# Patient Record
Sex: MALE | Race: ASIAN | NOT HISPANIC OR LATINO | Employment: UNEMPLOYED | URBAN - METROPOLITAN AREA
[De-identification: names, ages, dates, MRNs, and addresses within clinical notes are randomized per-mention and may not be internally consistent; named-entity substitution may affect disease eponyms.]

---

## 2020-10-15 ENCOUNTER — TELEPHONE (OUTPATIENT)
Dept: TRANSPLANT | Facility: CLINIC | Age: 35
End: 2020-10-15

## 2020-10-15 NOTE — TELEPHONE ENCOUNTER
Multiple calls to patient to schedule appointment ([phone]). No answer. Recording says call can not be completed at this time and try your call again later  Also called [phone]. Still unable to reach patient.    Records from referring scanned in [facility] and sent to be scanned into Epic.

## 2020-10-21 ENCOUNTER — TELEPHONE (OUTPATIENT)
Dept: TRANSPLANT | Facility: CLINIC | Age: 35
End: 2020-10-21

## 2020-10-21 DIAGNOSIS — I50.9 CONGESTIVE HEART FAILURE, UNSPECIFIED HF CHRONICITY, UNSPECIFIED HEART FAILURE TYPE: Primary | ICD-10-CM

## 2020-11-02 ENCOUNTER — HOSPITAL ENCOUNTER (OUTPATIENT)
Dept: PULMONOLOGY | Facility: CLINIC | Age: 35
Discharge: HOME | End: 2020-11-02
Payer: MEDICAID

## 2020-11-02 ENCOUNTER — CLINICAL SUPPORT (OUTPATIENT)
Dept: TRANSPLANT | Facility: CLINIC | Age: 35
End: 2020-11-02
Payer: MEDICAID

## 2020-11-02 ENCOUNTER — OFFICE VISIT (OUTPATIENT)
Dept: TRANSPLANT | Facility: CLINIC | Age: 35
End: 2020-11-02
Payer: MEDICAID

## 2020-11-02 ENCOUNTER — HOSPITAL ENCOUNTER (OUTPATIENT)
Dept: CARDIOLOGY | Facility: HOSPITAL | Age: 35
Discharge: HOME | End: 2020-11-02
Attending: INTERNAL MEDICINE
Payer: MEDICAID

## 2020-11-02 VITALS
DIASTOLIC BLOOD PRESSURE: 77 MMHG | WEIGHT: 200 LBS | HEART RATE: 77 BPM | HEIGHT: 63 IN | BODY MASS INDEX: 35.44 KG/M2 | SYSTOLIC BLOOD PRESSURE: 110 MMHG

## 2020-11-02 VITALS
SYSTOLIC BLOOD PRESSURE: 110 MMHG | BODY MASS INDEX: 37.34 KG/M2 | DIASTOLIC BLOOD PRESSURE: 77 MMHG | WEIGHT: 210.75 LBS | HEART RATE: 77 BPM | HEIGHT: 63 IN

## 2020-11-02 VITALS — BODY MASS INDEX: 36.68 KG/M2 | HEIGHT: 63 IN | WEIGHT: 207 LBS

## 2020-11-02 DIAGNOSIS — I50.9 CONGESTIVE HEART FAILURE, UNSPECIFIED HF CHRONICITY, UNSPECIFIED HEART FAILURE TYPE: Primary | ICD-10-CM

## 2020-11-02 DIAGNOSIS — I25.10 CORONARY ARTERY DISEASE INVOLVING NATIVE CORONARY ARTERY OF NATIVE HEART WITHOUT ANGINA PECTORIS: ICD-10-CM

## 2020-11-02 DIAGNOSIS — E11.9 TYPE 2 DIABETES MELLITUS WITHOUT COMPLICATION, WITH LONG-TERM CURRENT USE OF INSULIN: ICD-10-CM

## 2020-11-02 DIAGNOSIS — E78.5 DYSLIPIDEMIA: ICD-10-CM

## 2020-11-02 DIAGNOSIS — Z79.4 TYPE 2 DIABETES MELLITUS WITHOUT COMPLICATION, WITH LONG-TERM CURRENT USE OF INSULIN: ICD-10-CM

## 2020-11-02 DIAGNOSIS — I50.9 CONGESTIVE HEART FAILURE, UNSPECIFIED HF CHRONICITY, UNSPECIFIED HEART FAILURE TYPE: ICD-10-CM

## 2020-11-02 LAB
ASCENDING AORTA: 2.51 CM
AV INDEX (PROSTH): 0.48
AV MEAN GRADIENT: 1 MMHG
AV PEAK GRADIENT: 2 MMHG
AV VALVE AREA: 2.04 CM2
AV VELOCITY RATIO: 0.44
BSA FOR ECHO PROCEDURE: 2.01 M2
CV ECHO LV RWT: 0.25 CM
DOP CALC AO PEAK VEL: 0.68 M/S
DOP CALC AO VTI: 9.63 CM
DOP CALC LVOT AREA: 4.2 CM2
DOP CALC LVOT DIAMETER: 2.32 CM
DOP CALC LVOT PEAK VEL: 0.3 M/S
DOP CALC LVOT STROKE VOLUME: 19.65 CM3
DOP CALCLVOT PEAK VEL VTI: 4.65 CM
ECHO LV POSTERIOR WALL: 0.68 CM (ref 0.6–1.1)
FRACTIONAL SHORTENING: 17 % (ref 28–44)
INTERVENTRICULAR SEPTUM: 0.57 CM (ref 0.6–1.1)
LA MAJOR: 4.4 CM
LA MINOR: 4.96 CM
LA WIDTH: 4.16 CM
LEFT ATRIUM SIZE: 4.75 CM
LEFT ATRIUM VOLUME INDEX MOD: 39.3 ML/M2
LEFT ATRIUM VOLUME INDEX: 40.5 ML/M2
LEFT ATRIUM VOLUME MOD: 76 CM3
LEFT ATRIUM VOLUME: 78.32 CM3
LEFT INTERNAL DIMENSION IN SYSTOLE: 4.59 CM (ref 2.1–4)
LEFT VENTRICLE DIASTOLIC VOLUME INDEX: 76.4 ML/M2
LEFT VENTRICLE DIASTOLIC VOLUME: 147.74 ML
LEFT VENTRICLE MASS INDEX: 61 G/M2
LEFT VENTRICLE SYSTOLIC VOLUME INDEX: 50 ML/M2
LEFT VENTRICLE SYSTOLIC VOLUME: 96.6 ML
LEFT VENTRICULAR INTERNAL DIMENSION IN DIASTOLE: 5.51 CM (ref 3.5–6)
LEFT VENTRICULAR MASS: 118.44 G
PISA TR MAX VEL: 3.07 M/S
PULM VEIN S/D RATIO: 0.4
PV PEAK D VEL: 0.3 M/S
PV PEAK S VEL: 0.12 M/S
RA MAJOR: 5.5 CM
RA PRESSURE: 8 MMHG
RA WIDTH: 4.9 CM
RIGHT VENTRICULAR END-DIASTOLIC DIMENSION: 4.3 CM
RV TISSUE DOPPLER FREE WALL SYSTOLIC VELOCITY 1 (APICAL 4 CHAMBER VIEW): 4.58 CM/S
SINUS: 3.09 CM
STJ: 3 CM
TDI LATERAL: 0.11 M/S
TDI SEPTAL: 0.05 M/S
TDI: 0.08 M/S
TR MAX PG: 38 MMHG
TRICUSPID ANNULAR PLANE SYSTOLIC EXCURSION: 1.15 CM
TV REST PULMONARY ARTERY PRESSURE: 46 MMHG

## 2020-11-02 PROCEDURE — 94618 PULMONARY STRESS TESTING: ICD-10-PCS | Mod: 26,S$PBB,NTX, | Performed by: INTERNAL MEDICINE

## 2020-11-02 PROCEDURE — 99214 OFFICE O/P EST MOD 30 MIN: CPT | Mod: PBBFAC,25,TXP | Performed by: INTERNAL MEDICINE

## 2020-11-02 PROCEDURE — 93306 TTE W/DOPPLER COMPLETE: CPT | Mod: 26,NTX,, | Performed by: INTERNAL MEDICINE

## 2020-11-02 PROCEDURE — 99999 PR PBB SHADOW E&M-EST. PATIENT-LVL IV: CPT | Mod: PBBFAC,TXP,, | Performed by: INTERNAL MEDICINE

## 2020-11-02 PROCEDURE — 93306 ECHO (CUPID ONLY): ICD-10-PCS | Mod: 26,NTX,, | Performed by: INTERNAL MEDICINE

## 2020-11-02 PROCEDURE — 93306 TTE W/DOPPLER COMPLETE: CPT | Mod: TXP

## 2020-11-02 PROCEDURE — 94618 PULMONARY STRESS TESTING: CPT | Mod: 26,S$PBB,NTX, | Performed by: INTERNAL MEDICINE

## 2020-11-02 PROCEDURE — 99204 PR OFFICE/OUTPT VISIT, NEW, LEVL IV, 45-59 MIN: ICD-10-PCS | Mod: S$PBB,NTX,, | Performed by: INTERNAL MEDICINE

## 2020-11-02 PROCEDURE — 99999 PR PBB SHADOW E&M-EST. PATIENT-LVL IV: ICD-10-PCS | Mod: PBBFAC,TXP,, | Performed by: INTERNAL MEDICINE

## 2020-11-02 PROCEDURE — 94618 PULMONARY STRESS TESTING: CPT | Mod: PBBFAC,NTX | Performed by: INTERNAL MEDICINE

## 2020-11-02 PROCEDURE — 99204 OFFICE O/P NEW MOD 45 MIN: CPT | Mod: S$PBB,NTX,, | Performed by: INTERNAL MEDICINE

## 2020-11-02 RX ORDER — NAPROXEN SODIUM 220 MG/1
81 TABLET, FILM COATED ORAL DAILY
COMMUNITY
Start: 2019-12-20

## 2020-11-02 RX ORDER — SPIRONOLACTONE 25 MG/1
50 TABLET ORAL DAILY
COMMUNITY
Start: 2020-10-27 | End: 2020-11-17

## 2020-11-02 RX ORDER — CLOPIDOGREL BISULFATE 75 MG/1
75 TABLET ORAL DAILY
COMMUNITY
Start: 2019-12-20

## 2020-11-02 RX ORDER — TORSEMIDE 20 MG/1
40 TABLET ORAL 2 TIMES DAILY
Qty: 120 TABLET | Refills: 11 | Status: SHIPPED | OUTPATIENT
Start: 2020-11-02 | End: 2020-11-17

## 2020-11-02 RX ORDER — DULOXETIN HYDROCHLORIDE 30 MG/1
30 CAPSULE, DELAYED RELEASE ORAL DAILY
COMMUNITY
Start: 2020-08-24

## 2020-11-02 RX ORDER — ATORVASTATIN CALCIUM 40 MG/1
40 TABLET, FILM COATED ORAL NIGHTLY
COMMUNITY
Start: 2019-12-20

## 2020-11-02 RX ORDER — PANTOPRAZOLE SODIUM 40 MG/1
40 TABLET, DELAYED RELEASE ORAL EVERY MORNING
COMMUNITY
Start: 2020-10-26

## 2020-11-02 RX ORDER — LEVOTHYROXINE SODIUM 25 UG/1
25 TABLET ORAL DAILY
COMMUNITY
Start: 2020-10-14

## 2020-11-02 RX ORDER — CARVEDILOL 3.12 MG/1
3.12 TABLET ORAL 2 TIMES DAILY
COMMUNITY
Start: 2019-12-20

## 2020-11-02 RX ORDER — SACUBITRIL AND VALSARTAN 24; 26 MG/1; MG/1
24-26 TABLET, FILM COATED ORAL 2 TIMES DAILY
COMMUNITY
Start: 2020-10-29

## 2020-11-02 RX ORDER — MIRTAZAPINE 15 MG/1
15 TABLET, FILM COATED ORAL NIGHTLY
COMMUNITY
Start: 2020-09-04

## 2020-11-02 RX ORDER — INSULIN GLARGINE 100 [IU]/ML
INJECTION, SOLUTION SUBCUTANEOUS
COMMUNITY
Start: 2020-08-24

## 2020-11-02 RX ORDER — RANOLAZINE 500 MG/1
500 TABLET, EXTENDED RELEASE ORAL 2 TIMES DAILY
COMMUNITY

## 2020-11-02 RX ORDER — FUROSEMIDE 20 MG/1
20 TABLET ORAL 2 TIMES DAILY
COMMUNITY
End: 2020-11-02 | Stop reason: ALTCHOICE

## 2020-11-02 RX ORDER — ACETAMINOPHEN 500 MG
1 TABLET ORAL DAILY
COMMUNITY

## 2020-11-02 RX ORDER — METOCLOPRAMIDE 10 MG/1
10 TABLET ORAL 3 TIMES DAILY
COMMUNITY
Start: 2020-10-19

## 2020-11-02 NOTE — PROCEDURES
Kp Suarez is a 35 y.o.  male patient, who presents for a 6 minute walk test ordered by MEI Brewer.  The diagnosis is Shortness of Breath.  The patient's BMI is 36.7 kg/m2.  Predicted distance (lower limit of normal) is 538.21 meters.      Test Results:    The test was completed without stopping.  The total time walked was 360 seconds.  During walking, the patient reported:  Chest pain, Dyspnea. The patient used no assistive devices  during testing.     11/02/2020---------Distance: 198.12 meters (650 feet)     O2 Sat % Supplemental Oxygen Heart Rate Blood Pressure Humberto Scale   Pre-exercise  (Resting) 96 % Room Air 101 bpm 112/74 mmHg 0.5   During Exercise 96 % Room Air 119 bpm 105/76 mmHg 4   Post-exercise  (Recovery) 97 % Room Air  110 bpm   mmHg       Recovery Time: 79 seconds    Performing nurse/tech: Parrish MUNIZ      PREVIOUS STUDY:   The patient has not had a previous study.      CLINICAL INTERPRETATION:  Six minute walk distance is 198.12 meters (650 feet) with somewhat heavy dyspnea.  During exercise, there was no significant desaturation while breathing room air.  Both blood pressure and heart rate remained stable with walking.  Tachycardia was present prior to exercise.  The patient reported non-pulmonary symptoms during exercise.  Significant exercise impairment is likely due to subjective symptoms.  No previous study performed.  Based upon age and body mass index, exercise capacity is less than predicted.

## 2020-11-02 NOTE — PATIENT INSTRUCTIONS
-stop lasix.  -start taking torsemide 40 mg PO BID  -increase spironolactone to 50 mg daily  -BMP and magnesium every week, starting this Friday.  -echocardiogram today  -f/u in 2 weeks  -monitor your weight daily. Call us if you are not loosing weight.  -Low salt intake.  -Limit fluid intake to NMT 1.5 liters/day.

## 2020-11-02 NOTE — PROGRESS NOTES
Not asked to see patient in clinic at his time nor was pre- transplant coordinators. Patient is to follow up in 2 weeks and if he needs to be seen at this time we will be informed.

## 2020-11-02 NOTE — LETTER
November 4, 2020        Orin Peters  [address]  [address]  [facility] [place] [address]  Phone: [phone]  Fax: [phone]             [id number]  [address]  [place] [address]  Phone: [phone]   Patient: Kp Suarez   MR Number: [id number]   YOB: 1985   Date of Visit: 11/2/2020       Dear Dr. Orin Peters    Thank you for referring Kp Suarez to me for evaluation. Attached you will find relevant portions of my assessment and plan of care.    If you have questions, please do not hesitate to call me. I look forward to following Kp Suarez along with you.    Sincerely,    Vasiliy Avery MD    Enclosure    If you would like to receive this communication electronically, please contact [email or web address] or [phone] to request [employer] Link access.    [employer] Link is a tool which provides read-only access to select patient information with whom you have a relationship. Its easy to use and provides real time access to review your patients record including encounter summaries, notes, results, and demographic information.    If you feel you have received this communication in error or would no longer like to receive these types of communications, please e-mail [email or web address]

## 2020-11-02 NOTE — NURSING NOTE
Pt verified by 2 identifiers, allergies reviewed.  IV access for echo contrast attempted X 1 unsuccessful.  Pt tolerated well w/o C/O.

## 2020-11-02 NOTE — PROGRESS NOTES
Advanced Heart Failure and Transplantation Clinic Follow up.        Attending Physician: Vasiliy Avery MD.  The patient's last visit with me was on Visit date not found.   Reason for the visit: evaluation for advanced therapies.      HPI.  34 yo man from [facility], with a history of CAD (severe 3 vessel disease), ischemic CMP and HFrEF, all diagnosed November 2019. He underwent CABG at that time (12/2019). Since then he has been dealing with congestion, large ascites and pleural effusions. He has been referred from [place] for considerations for advanced therapies.   Around the same time he was found to have diabetes and hypertension.  He has an ICD placed on July 16, 2020.  Since his CABG he said he was admitted 5 times, 4 times for congestion and 1 for COVID back on May.  His brother is with the patient.  Their understanding is that they have been referred for advanced therapies/transplant evaluation. They don't feel that is what he needs. He wants to have the fluid removed.  He only takes lasix 20 mg twice a day.      PMH.  HF (mother, passed away last year from cardiac arrest)  CAD mother  DM  HTN  Liver cancer (father)    Review of Systems   Constitutional: Negative for chills, diaphoresis and fever.   HENT: Negative for nasal congestion, rhinorrhea and sore throat.    Eyes: Negative for visual disturbance.   Respiratory: Positive for shortness of breath.    Cardiovascular: Positive for leg swelling. Negative for chest pain.   Gastrointestinal: Negative for abdominal pain, diarrhea, nausea and vomiting.   Genitourinary: Negative for difficulty urinating, dysuria and hematuria.   Integumentary:  Negative for rash.   Neurological: Negative for seizures, syncope and light-headedness.   Psychiatric/Behavioral: Negative for agitation and hallucinations.        Past Medical History:   Diagnosis Date    Cardiomyopathy     CHF  "(congestive heart failure)     Coronary artery disease     Diabetes mellitus, type 2     Hyperlipidemia     Hypertension     Obesity         Past Surgical History:   Procedure Laterality Date    CORONARY ARTERY BYPASS GRAFT              Review of patient's allergies indicates:   Allergen Reactions    Levofloxacin         Current Outpatient Medications   Medication Instructions    aspirin 81 mg, Oral, Daily    atorvastatin (LIPITOR) 40 mg, Oral, Nightly    carvediloL (COREG) 3.125 mg, Oral, 2 times daily    cholecalciferol, vitamin D3, (VITAMIN D3) 50 mcg (2,000 unit) Cap 1 capsule, Oral, Daily    clopidogreL (PLAVIX) 75 mg, Oral, Daily    DULoxetine (CYMBALTA) 30 mg, Oral, Daily    ENTRESTO 24-26 mg per tablet 24-26 mg, Oral, 2 times daily    furosemide (LASIX) 20 mg, Oral, 2 times daily    LANTUS SOLOSTAR U-100 INSULIN glargine 100 units/mL (3mL) SubQ pen ADM 25 UNI SC QD HS    levothyroxine (SYNTHROID) 25 mcg, Oral, Daily    metoclopramide HCl (REGLAN) 10 mg, Oral, 3 times daily    mirtazapine (REMERON) 15 mg, Oral, Nightly    pantoprazole (PROTONIX) 40 mg, Oral, Every morning    ranolazine (RANEXA) 500 mg, Oral, 2 times daily    spironolactone (ALDACTONE) 25 mg, Oral, Daily        Vitals:    11/02/20 1008   BP: 110/77   Pulse: 77        Wt Readings from Last 3 Encounters:   11/02/20 95.6 kg (210 lb 12.2 oz)   11/02/20 93.9 kg (207 lb)     Temp Readings from Last 3 Encounters:   No data found for Temp     BP Readings from Last 3 Encounters:   11/02/20 110/77     Pulse Readings from Last 3 Encounters:   11/02/20 77        Body mass index is 37.33 kg/m². Estimated body surface area is 2.06 meters squared as calculated from the following:    Height as of this encounter: 5' 3" (1.6 m).    Weight as of this encounter: 95.6 kg (210 lb 12.2 oz).     Physical Exam   Constitutional: He is oriented to person, place, and time. He appears well-developed and well-nourished.   HENT:   Head: Normocephalic " and atraumatic.   Right Ear: External ear normal.   Left Ear: External ear normal.   Eyes: Pupils are equal, round, and reactive to light. Conjunctivae and EOM are normal.   Neck: Normal range of motion. Neck supple. JVD present.   Cardiovascular: Normal rate, regular rhythm, S1 normal, S2 normal and intact distal pulses. Exam reveals no gallop and no friction rub.   No murmur heard.  Pulmonary/Chest: Effort normal and breath sounds normal.   Abdominal: Soft. Bowel sounds are normal. He exhibits no distension. There is no abdominal tenderness. There is no rebound and no guarding.   Musculoskeletal:         General: Edema present.   Neurological: He is alert and oriented to person, place, and time. He has normal strength.        Lab Results   Component Value Date    BNP 1,890 (H) 11/02/2020     11/02/2020    K 4.0 11/02/2020     11/02/2020    CO2 25 11/02/2020    BUN 20 11/02/2020    CREATININE 1.0 11/02/2020     (H) 11/02/2020    AST 17 11/02/2020    ALT 9 (L) 11/02/2020    ALBUMIN 3.2 (L) 11/02/2020    PROT 6.2 11/02/2020    BILITOT 3.1 (H) 11/02/2020    TSH 2.453 11/02/2020           Assessment and Plan:  Active Problem List with Overview Notes    Diagnosis Date Noted    CAD (coronary artery disease), native coronary artery 11/04/2020    Diabetes mellitus 11/04/2020    Dyslipidemia 11/04/2020    Congestive heart failure           1. Acute on chronic systolic HF, NYHA class IV, stage C.  Ischemic CMP from multivessel CAD s/p CABG under a year ago.  Warm and in anasarca on lasix 20 mg PO BID. Clearly under treated. He has been offered the option to be admitted to the hospital but patient declines at this time. As an alternative plan:  -stop lasix. He claims 20 mg BID does not make him to urinate at all.  -start taking torsemide 40 mg PO BID  -increase spironolactone to 50 mg daily  -BMP and magnesium every week, starting this Friday.  -echocardiogram today    Holding on evaluation for advanced  therapies due to:   under treatment since diagnosis. Plan to try to achieve euvolemia, titrate medications up and reassess his clinical course.    2. CAD s.p CABG.  Continue aspirin, statins, B-Blockers and entresto.

## 2020-11-04 PROBLEM — I25.10 CAD (CORONARY ARTERY DISEASE), NATIVE CORONARY ARTERY: Status: ACTIVE | Noted: 2020-11-04

## 2020-11-04 PROBLEM — E11.9 DIABETES MELLITUS: Status: ACTIVE | Noted: 2020-11-04

## 2020-11-04 PROBLEM — E78.5 DYSLIPIDEMIA: Status: ACTIVE | Noted: 2020-11-04

## 2020-11-05 ENCOUNTER — TELEPHONE (OUTPATIENT)
Dept: TRANSPLANT | Facility: CLINIC | Age: 35
End: 2020-11-05

## 2020-11-05 ENCOUNTER — DOCUMENTATION ONLY (OUTPATIENT)
Dept: TRANSPLANT | Facility: CLINIC | Age: 35
End: 2020-11-05

## 2020-11-05 NOTE — TELEPHONE ENCOUNTER
"9:15 am: F/U on todays nurse phone review indicating to call pt for phone assessment   See Dr. Best 11/2/note and plan  Called and spoke with pt at this time  Pt reported he is feeling better and stated he "has lost wt"    Pt confirmed with me he did stop taking Lasix as directed to do and started Torsemide 40 mg BID on 11/3  Pt also confirmed he is taking (2) Spironolactone tablets every am =50 mg    Pt reports increased U.O, much less swelling to his lower legs, thighs still swollen-pt reports is improving   Still with abdominal distention, and a little better. Is breathing easier  Pt reported the following wts and bp and hr's  11/2:  208     98/83   96  11/3:  207   Am: 104/     Pm:  113/94- 97  11/4:  201   95/79     95  11/5:  195 ( rechecked w/ me on phone)  98/75   93    Total of 12 pds down since changing to Torsemide   Denies weakness, dizziness or light headedness    Noted In plan pt to have BMP tomorrow-asked pt and stated he is aware and someone arranged this with him  Noted pt is on nurse lab phone review for labs tomorrow @ [facility].     Told pt I will make  aware of all as in this note, to continue the same plan for now and if Dr. Wood wants to make any changes I will let him know    1030 am:  Will route this note to Dr. Wood and will send a phone message to alert him to this note as well            "

## 2020-11-06 ENCOUNTER — TELEPHONE (OUTPATIENT)
Dept: TRANSPLANT | Facility: CLINIC | Age: 35
End: 2020-11-06

## 2020-11-06 NOTE — TELEPHONE ENCOUNTER
Labs reviewed K 3.9  BUN?CR 19/1.0  MG 2.2. Labs reviewed by Dr Vasiliy Bronson.  Per Dr ALEXANDER he spoke to pt earlier today and pt reported having lost 12lbs thus far. I instructed pt to decrease torsemide to 40mg once daily, and to please report to CHF nurse or MD if he hasnt lost any additional weight by Tuesday or Wednesday of next week. Dona spoke to pt today and arranged labs. Pt verbalized understanding.

## 2020-11-16 ENCOUNTER — TELEPHONE (OUTPATIENT)
Dept: TRANSPLANT | Facility: CLINIC | Age: 35
End: 2020-11-16

## 2020-11-16 NOTE — TELEPHONE ENCOUNTER
5:15 pm:   F/U on  nurse lab phone review from 11/13/20  See 11/2 note by Dr. Wood   And 11/6 note by [facility]   Received BMP results today:   Na/K:   139/4.0      Cl/Co2:  102/27   Bun/Cr:  18.2/1.10  Called and spoke with pt at this time.  Pt reported he is taking Torsemide 20 mg twice daily to equal a total of 40 mg a day  Wts/BP and Puls chart below from pt:  11/6   191   am 92/69    97       Pm: 78/59    97  11/7:  184         98/77   95                92/73    85  11/8:   179        88/70    91               94/77     93  11/9:  178         91/74    88               86/71     92  11/10: 177        87/62    89                94/73     86  11/11:  175       97/74    97                98/69     89  1/12:    176       88/65    85                90/67    94  11/13:   176      93/65    90                93/72     91  11/14:  175       98/81     95                 90/67    82  11/15:  171        93/77     91               87/69     87  11/16:   172        93/75    91     19 pound wt loss since 11/6     And 36 pd wt loss since 11/2  Pt reports no longer with swelling to lower legs ankles and feet   Still with some abdominal distention   Able to eat well    No light headedness or dizziness  Pt reports he feels much better    Due to RTC tomorrow @ 2:00 pm to see Dr. Wood and plans to be at that appt.     Pt will bring wt log with him    Have alerted Dr. Wood by phone message I have spoken with pt and entering this full not for his review

## 2020-11-17 ENCOUNTER — OFFICE VISIT (OUTPATIENT)
Dept: TRANSPLANT | Facility: CLINIC | Age: 35
End: 2020-11-17
Payer: MEDICAID

## 2020-11-17 VITALS
OXYGEN SATURATION: 99 % | SYSTOLIC BLOOD PRESSURE: 103 MMHG | HEART RATE: 88 BPM | HEIGHT: 62 IN | DIASTOLIC BLOOD PRESSURE: 74 MMHG | WEIGHT: 177.25 LBS | BODY MASS INDEX: 32.62 KG/M2

## 2020-11-17 DIAGNOSIS — E11.9 TYPE 2 DIABETES MELLITUS WITHOUT COMPLICATION, WITH LONG-TERM CURRENT USE OF INSULIN: ICD-10-CM

## 2020-11-17 DIAGNOSIS — I25.10 CORONARY ARTERY DISEASE INVOLVING NATIVE CORONARY ARTERY OF NATIVE HEART WITHOUT ANGINA PECTORIS: ICD-10-CM

## 2020-11-17 DIAGNOSIS — E78.5 DYSLIPIDEMIA: ICD-10-CM

## 2020-11-17 DIAGNOSIS — I50.9 CONGESTIVE HEART FAILURE, UNSPECIFIED HF CHRONICITY, UNSPECIFIED HEART FAILURE TYPE: Primary | ICD-10-CM

## 2020-11-17 DIAGNOSIS — Z79.4 TYPE 2 DIABETES MELLITUS WITHOUT COMPLICATION, WITH LONG-TERM CURRENT USE OF INSULIN: ICD-10-CM

## 2020-11-17 PROCEDURE — 99999 PR PBB SHADOW E&M-EST. PATIENT-LVL V: ICD-10-PCS | Mod: PBBFAC,,, | Performed by: INTERNAL MEDICINE

## 2020-11-17 PROCEDURE — 99215 PR OFFICE/OUTPT VISIT, EST, LEVL V, 40-54 MIN: ICD-10-PCS | Mod: S$PBB,,, | Performed by: INTERNAL MEDICINE

## 2020-11-17 PROCEDURE — 99215 OFFICE O/P EST HI 40 MIN: CPT | Mod: PBBFAC | Performed by: INTERNAL MEDICINE

## 2020-11-17 PROCEDURE — 99215 OFFICE O/P EST HI 40 MIN: CPT | Mod: S$PBB,,, | Performed by: INTERNAL MEDICINE

## 2020-11-17 PROCEDURE — 99999 PR PBB SHADOW E&M-EST. PATIENT-LVL V: CPT | Mod: PBBFAC,,, | Performed by: INTERNAL MEDICINE

## 2020-11-17 RX ORDER — TORSEMIDE 20 MG/1
40 TABLET ORAL DAILY
Qty: 60 TABLET | Refills: 11 | Status: SHIPPED | OUTPATIENT
Start: 2020-11-17 | End: 2021-11-17

## 2020-11-17 RX ORDER — SPIRONOLACTONE 50 MG/1
50 TABLET, FILM COATED ORAL DAILY
Qty: 30 TABLET | Refills: 11 | Status: SHIPPED | OUTPATIENT
Start: 2020-11-17 | End: 2021-11-17

## 2020-11-17 NOTE — PATIENT INSTRUCTIONS
1. You still has extra fluid on you. Please continue taking same dose of torsemide 40 mg daily.  2.Continue low sodium diet and fluid restriction. No more than 1.5 liters/day.  3. Have blood test done today.  4. We will continue blood test every week (next would be next Monday).  5. Follow up in 2 weeks.  6. Continue spironolactone 50 mg daily.

## 2020-11-17 NOTE — LETTER
November 17, 2020        Orin Peters  [address]  [address]  [facility] [address]  Phone: [phone]  Fax: [phone]             [id number]  [address]  [place] [address]  Phone: [phone]   Patient: Kp Suarez   MR Number: [id number]   YOB: 1985   Date of Visit: 11/17/2020       Dear Dr. Orin Peters    Thank you for referring Kp Suarez to me for evaluation. Attached you will find relevant portions of my assessment and plan of care.    If you have questions, please do not hesitate to call me. I look forward to following Kp Suarez along with you.    Sincerely,    Vasiliy Avery MD    Enclosure    If you would like to receive this communication electronically, please contact [email or web address] or [phone] to request [employer] access.    [employer] is a tool which provides read-only access to select patient information with whom you have a relationship. Its easy to use and provides real time access to review your patients record including encounter summaries, notes, results, and demographic information.    If you feel you have received this communication in error or would no longer like to receive these types of communications, please e-mail [email or web address]

## 2020-11-17 NOTE — PROGRESS NOTES
Advanced Heart Failure and Transplantation Clinic Follow up.      Attending Physician: Vasiliy Avery MD.  The patient's last visit with me was on 11/2/2020.   Reason for the visit: follow up      HPI.  34 yo man from [facility], with a history of CAD (severe 3 vessel disease), ischemic CMP and HFrEF, all diagnosed November 2019. He underwent CABG at that time (12/2019). Since then he has been dealing with congestion, large ascites and pleural effusions. He has been referred from [place] for considerations for advanced therapies.   Around the same time he was found to have diabetes and hypertension.  He has an ICD placed on July 16, 2020.  Since his CABG he said he was admitted 5 times, 4 times for congestion and 1 for COVID back on May.    Last visit 2 weeks ago, we increased his diuretic regimen, as he was in a low dose of 20 mg of lasix twice a day. We started him on torsemide 40 mg PO BID to produce a good and rapid initial response and then we decreased his dose to 40 mg daily.    Since his last visit he has lost 33 pounds (in 2 weeks). He said this is the lowest weight he has had in over a year.  He accepts a significant reduction of his lower extremity edema. His abdominal distention and early satiety/dyspepsia have also improved. He now breaths better, orthopnea improving.        Review of Systems   Constitutional: Negative for chills, diaphoresis and fever.   HENT: Negative for nasal congestion, rhinorrhea and sore throat.    Eyes: Negative for visual disturbance.   Respiratory: Positive for cough and shortness of breath.    Cardiovascular: Positive for leg swelling. Negative for chest pain.   Gastrointestinal: Negative for abdominal pain, diarrhea, nausea and vomiting.   Genitourinary: Negative for difficulty urinating, dysuria and hematuria.   Integumentary:  Negative for rash.   Neurological: Negative for seizures,  syncope and light-headedness.   Psychiatric/Behavioral: Negative for agitation and hallucinations.        Past Medical History:   Diagnosis Date    Cardiomyopathy     CHF (congestive heart failure)     Coronary artery disease     Diabetes mellitus, type 2     Hyperlipidemia     Hypertension     Obesity         Past Surgical History:   Procedure Laterality Date    CORONARY ARTERY BYPASS GRAFT          No family history on file.     Review of patient's allergies indicates:   Allergen Reactions    Levofloxacin         Current Outpatient Medications   Medication Instructions    aspirin 81 mg, Oral, Daily    atorvastatin (LIPITOR) 40 mg, Oral, Nightly    carvediloL (COREG) 3.125 mg, Oral, 2 times daily    cholecalciferol, vitamin D3, (VITAMIN D3) 50 mcg (2,000 unit) Cap 1 capsule, Oral, Daily    clopidogreL (PLAVIX) 75 mg, Oral, Daily    DULoxetine (CYMBALTA) 30 mg, Oral, Daily    ENTRESTO 24-26 mg per tablet 24-26 mg, Oral, 2 times daily    LANTUS SOLOSTAR U-100 INSULIN glargine 100 units/mL (3mL) SubQ pen ADM 25 UNI SC QD HS    levothyroxine (SYNTHROID) 25 mcg, Oral, Daily    metoclopramide HCl (REGLAN) 10 mg, Oral, 3 times daily    mirtazapine (REMERON) 15 mg, Oral, Nightly    pantoprazole (PROTONIX) 40 mg, Oral, Every morning    ranolazine (RANEXA) 500 mg, Oral, 2 times daily    spironolactone (ALDACTONE) 50 mg, Oral, Daily    torsemide (DEMADEX) 40 mg, Oral, Daily        Vitals:    11/17/20 1415   BP: 103/74   Pulse: 88        Wt Readings from Last 3 Encounters:   11/17/20 80.4 kg (177 lb 4 oz)   11/02/20 90.7 kg (200 lb)   11/02/20 93.9 kg (207 lb)     Temp Readings from Last 3 Encounters:   No data found for Temp     BP Readings from Last 3 Encounters:   11/17/20 103/74   11/02/20 110/77   11/02/20 110/77     Pulse Readings from Last 3 Encounters:   11/17/20 88   11/02/20 77   11/02/20 77        Body mass index is 32.42 kg/m². Estimated body surface area is 1.88 meters squared as calculated  "from the following:    Height as of this encounter: 5' 2" (1.575 m).    Weight as of this encounter: 80.4 kg (177 lb 4 oz).     Physical Exam   Constitutional: He is oriented to person, place, and time. He appears well-developed and well-nourished.   HENT:   Head: Normocephalic and atraumatic.   Right Ear: External ear normal.   Left Ear: External ear normal.   Eyes: Pupils are equal, round, and reactive to light. Conjunctivae and EOM are normal.   Neck: Normal range of motion. Neck supple. JVD present.   Cardiovascular: Normal rate, regular rhythm, S1 normal, S2 normal and intact distal pulses. Exam reveals no gallop and no friction rub.   No murmur heard.  Pulmonary/Chest: Effort normal and breath sounds normal.   Abdominal: Soft. Bowel sounds are normal. He exhibits no distension. There is no abdominal tenderness. There is no rebound and no guarding.   Musculoskeletal:         General: Edema present.   Neurological: He is alert and oriented to person, place, and time. He has normal strength.        Lab Results   Component Value Date    BNP 1,890 (H) 11/02/2020     11/17/2020    K 4.1 11/17/2020    CL 97 11/17/2020    CO2 29 11/17/2020    BUN 21 (H) 11/17/2020    CREATININE 1.4 11/17/2020     (H) 11/17/2020    AST 17 11/02/2020    ALT 9 (L) 11/02/2020    ALBUMIN 3.2 (L) 11/02/2020    PROT 6.2 11/02/2020    BILITOT 3.1 (H) 11/02/2020    WBC 5.65 11/02/2020    HGB 13.8 (L) 11/02/2020    HCT 44.0 11/02/2020     (L) 11/02/2020    TSH 2.453 11/02/2020           Results for orders placed during the hospital encounter of 11/02/20   Echo Color Flow Doppler? Yes    Narrative · Technically challenging study. Unable to obtain IV access for   administration of echo contrast.  · The left ventricle is normal in size with severely decreased systolic   function. The estimated ejection fraction is 20%.  · Diastolic dysfunction.  · Mild right ventricular enlargement. Moderately reduced right ventricular "   systolic function.  · The estimated PA systolic pressure is 46 mmHg.  · Intermediate central venous pressure (8 mmHg).  · There is pulmonary hypertension.  · Small pericardial effusion around the right atrium.  · There is a right pleural effusion.  · Mild left atrial enlargement.            Assessment and Plan:  Patient Active Problem List    Diagnosis Date Noted    CAD (coronary artery disease), native coronary artery 11/04/2020    Diabetes mellitus 11/04/2020    Dyslipidemia 11/04/2020    Congestive heart failure           1. Acute on chronic systolic HF, NYHA class IV, stage C.  Ischemic CMP from multivessel CAD s/p CABG under a year ago.  Warm and hypervolemic. Volume status improved but JVP is still high on exam.  Plan:  -hold on diuretics today and tomorrow to allow for volume shift and redistribution.  -reassume torsemide 40 mg daily on Thursday.   -continue current dose of carvedilol, entresto and spironolactone.  -follow up BMP on Friday      Holding on evaluation for advanced therapies due to:   under treatment since diagnosis. Plan to try to achieve euvolemia, titrate medications up and reassess his clinical course.     2. CAD s.p CABG.  Continue aspirin, statins, B-Blockers and entresto.    F/u in 2 weeks

## 2020-11-23 ENCOUNTER — TELEPHONE (OUTPATIENT)
Dept: TRANSPLANT | Facility: CLINIC | Age: 35
End: 2020-11-23

## 2020-11-23 DIAGNOSIS — I50.9 CONGESTIVE HEART FAILURE, UNSPECIFIED HF CHRONICITY, UNSPECIFIED HEART FAILURE TYPE: Primary | ICD-10-CM

## 2020-11-23 NOTE — TELEPHONE ENCOUNTER
4:50 pm  F/U on todays nurse lab phone review  See my 11/16 note and Dr. Wood's 11/17 clinic note  Called [place] to see if lab results from today are available  Apparently pt had labs drawn 11/20/20 not today although she confirmed order they have was for today   She said pt did not have any labs drawn today    Asked she fax results of the BMP to me and said she will  Provided her with this office fax number    4:55 pm: Called and spoke with pt  Pt confirmed he stopped taking Torsemide and Spironolactone 11/17 pm and resumed 11/19 pm and had blood work 11/20   Pt reports wt today 170 and is still weighing daily  Little swelling  No sob and said he feels good   Still with good u.o.  BP 90s/ 60-80    5:00 pM Received fax with CMP results from 11/20 [facility] collection) :  Na/K:  142/3.9    Bun/Cr:  18/1.1   T.Bili: 2.4  ( was 3.1 in epic lab)     5:10 pm Spoke with Dr. Wood Informed of all as above: assessment and labs  TORB: Dr. Hai Avery:  CMP when returns to see me 12/10   BMP weakly for now   No BNP  I will arrange with outside lab tomorrow and let pt know

## 2020-11-27 ENCOUNTER — TELEPHONE (OUTPATIENT)
Dept: TRANSPLANT | Facility: CLINIC | Age: 35
End: 2020-11-27

## 2020-11-27 NOTE — TELEPHONE ENCOUNTER
Per follow up on my coworker SALINAS Grier RN phone reviews from 11/23/20. I have tried to reach pt this am and this afternoon to have pt have labs drawn. No answer at only contact listed, and pt doesn't have a vm set up.Will continue to try to reach pt.

## 2020-11-27 NOTE — TELEPHONE ENCOUNTER
Called pt again 3rd try was able to reach pt. Pt cant go to lab today, but say she will go for 8am to [place] on Monday 11/30/20. Pt denies any wt gain, sob, or swelling at this time. Lab orders faxed today for [facility] Monday.

## 2020-12-01 ENCOUNTER — TELEPHONE (OUTPATIENT)
Dept: TRANSPLANT | Facility: CLINIC | Age: 35
End: 2020-12-01

## 2020-12-01 ENCOUNTER — DOCUMENTATION ONLY (OUTPATIENT)
Dept: TRANSPLANT | Facility: CLINIC | Age: 35
End: 2020-12-01

## 2020-12-10 NOTE — PROGRESS NOTES
"                                                                                       [facility] Follow up.      Attending Physician: Vasiliy Avery MD.  The patient's last visit with me was on 11/17/2020.   Reason for the visit: regular follow up.      HPI.  34 yo man from [facility], with a history of CAD (severe 3 vessel disease), ischemic CMP and HFrEF, all diagnosed November 2019. He underwent CABG at that time (12/2019). Since then he has been dealing with congestion, large ascites and pleural effusions. He has been referred from [place] for considerations for advanced therapies.   Around the same time he was found to have diabetes and hypertension.  He had an ICD placed on July 16, 2020.  Since his CABG he said he was admitted 5 times, 4 times for congestion and 1 for COVID back on May.     Last visit 2 weeks ago, we increased his diuretic regimen, as he was in a low dose of 20 mg of lasix twice a day. We started him on torsemide 40 mg PO BID to produce a good and rapid initial response and then we decreased his dose to 40 mg daily.     Last visit he had lost 33 pounds (in 2 weeks). He said this is the lowest weight he has had in over a year.  He accepts a significant reduction of his lower extremity edema. His abdominal distention and early satiety/dyspepsia had also improved. He was breathing better, orthopnea improving.    Today patient is thrilled for the way he is doing.  He lost 10 pounds more of fluid since his last visit. He feels the best since his diagnosis. He is now able to sleep for 6-7 straight hours every night, "without using the machine". He has more energy, his appetite is improving. He is being careful with his sodium intake.     Review of Systems   Constitutional: Negative for chills, diaphoresis and fever.   HENT: Negative for nasal congestion, rhinorrhea and sore throat.    Eyes: Negative for visual disturbance.   Respiratory: Negative for cough, " chest tightness, shortness of breath and wheezing.    Cardiovascular: Negative for chest pain and leg swelling.   Gastrointestinal: Negative for abdominal pain, diarrhea, nausea and vomiting.   Genitourinary: Negative for difficulty urinating, dysuria and hematuria.   Integumentary:  Negative for rash.   Neurological: Negative for seizures, syncope and light-headedness.   Psychiatric/Behavioral: Negative for agitation and hallucinations.        Past Medical History:   Diagnosis Date    Cardiomyopathy     CHF (congestive heart failure)     Coronary artery disease     Diabetes mellitus, type 2     Hyperlipidemia     Hypertension     Obesity         Past Surgical History:   Procedure Laterality Date    CORONARY ARTERY BYPASS GRAFT          History reviewed. No pertinent family history.     Review of patient's allergies indicates:   Allergen Reactions    Levofloxacin         Current Outpatient Medications   Medication Instructions    aspirin 81 mg, Oral, Daily    atorvastatin (LIPITOR) 40 mg, Oral, Nightly    carvediloL (COREG) 3.125 mg, Oral, 2 times daily    cholecalciferol, vitamin D3, (VITAMIN D3) 50 mcg (2,000 unit) Cap 1 capsule, Oral, Daily    clopidogreL (PLAVIX) 75 mg, Oral, Daily    DULoxetine (CYMBALTA) 30 mg, Oral, Daily    ENTRESTO 24-26 mg per tablet 24-26 mg, Oral, 2 times daily    LANTUS SOLOSTAR U-100 INSULIN glargine 100 units/mL (3mL) SubQ pen ADM 25 UNI SC QD HS    levothyroxine (SYNTHROID) 25 mcg, Oral, Daily    metoclopramide HCl (REGLAN) 10 mg, Oral, 3 times daily    mirtazapine (REMERON) 15 mg, Oral, Nightly    pantoprazole (PROTONIX) 40 mg, Oral, Every morning    ranolazine (RANEXA) 500 mg, Oral, 2 times daily    spironolactone (ALDACTONE) 50 mg, Oral, Daily    torsemide (DEMADEX) 40 mg, Oral, Daily        Vitals:    12/11/20 1111   BP: 105/77   Pulse: 97        Wt Readings from Last 3 Encounters:   12/11/20 75.8 kg (167 lb 1.7 oz)   11/17/20 80.4 kg (177 lb 4 oz)  "  11/02/20 90.7 kg (200 lb)     Temp Readings from Last 3 Encounters:   No data found for Temp     BP Readings from Last 3 Encounters:   12/11/20 105/77   11/17/20 103/74   11/02/20 110/77     Pulse Readings from Last 3 Encounters:   12/11/20 97   11/17/20 88   11/02/20 77        Body mass index is 29.6 kg/m². Estimated body surface area is 1.84 meters squared as calculated from the following:    Height as of this encounter: 5' 3" (1.6 m).    Weight as of this encounter: 75.8 kg (167 lb 1.7 oz).     Physical Exam   Constitutional: He is oriented to person, place, and time. He appears well-developed and well-nourished.   HENT:   Head: Normocephalic and atraumatic.   Right Ear: External ear normal.   Left Ear: External ear normal.   Eyes: Pupils are equal, round, and reactive to light. Conjunctivae and EOM are normal.   Neck: Normal range of motion. Neck supple. JVD present.   Cardiovascular: Normal rate, regular rhythm, S1 normal, S2 normal and intact distal pulses. Exam reveals no gallop and no friction rub.   No murmur heard.  JVP 12 cmH20 with + HJR.   Pulmonary/Chest: Effort normal and breath sounds normal.   Abdominal: Soft. Bowel sounds are normal. He exhibits no distension. There is no abdominal tenderness. There is no rebound and no guarding.   Musculoskeletal:         General: No edema.   Neurological: He is alert and oriented to person, place, and time. He has normal strength.        Lab Results   Component Value Date    BNP 1,890 (H) 11/02/2020     11/17/2020    K 4.1 11/17/2020    CL 97 11/17/2020    CO2 29 11/17/2020    BUN 21 (H) 11/17/2020    CREATININE 1.4 11/17/2020     (H) 11/17/2020    AST 17 11/02/2020    ALT 9 (L) 11/02/2020    ALBUMIN 3.2 (L) 11/02/2020    PROT 6.2 11/02/2020    BILITOT 3.1 (H) 11/02/2020    WBC 5.65 11/02/2020    HGB 13.8 (L) 11/02/2020    HCT 44.0 11/02/2020     (L) 11/02/2020    TSH 2.453 11/02/2020         Results for orders placed during the hospital " encounter of 11/02/20   Echo Color Flow Doppler? Yes    Narrative · Technically challenging study. Unable to obtain IV access for   administration of echo contrast.  · The left ventricle is normal in size with severely decreased systolic   function. The estimated ejection fraction is 20%.  · Diastolic dysfunction.  · Mild right ventricular enlargement. Moderately reduced right ventricular   systolic function.  · The estimated PA systolic pressure is 46 mmHg.  · Intermediate central venous pressure (8 mmHg).  · There is pulmonary hypertension.  · Small pericardial effusion around the right atrium.  · There is a right pleural effusion.  · Mild left atrial enlargement.          Assessment and Plan:     Patient Active Problem List    Diagnosis Date Noted    CAD (coronary artery disease), native coronary artery 11/04/2020    Diabetes mellitus 11/04/2020    Dyslipidemia 11/04/2020    Congestive heart failure         1. Chronic systolic HF, NYHA class II, stage C.  Ischemic CMP from multivessel CAD s/p CABG under a year ago. Early in the course of his disease. Has not been tried on optimal medical therapy and euvolemia.  Since I started following him, he has lost large amount of weight. From 215 pounds to 167 pounds (50 pounds).  Currently warm and slightly hypervolemic.  Plan:  -continue current doses of all his medications, including torsemide 40 mg daily.  -BMP every week for the following month.  -follow up in 2 months.  -Patient on carvedilol 3.125 mg BID, spironolactone 25 mg BID and entresto.  I reinforced with patient that is ok to take entresto as long as BP 80/50 or above.        2. CAD s.p CABG.  Continue aspirin, statins, B-Blockers and entresto.

## 2020-12-11 ENCOUNTER — OFFICE VISIT (OUTPATIENT)
Dept: TRANSPLANT | Facility: CLINIC | Age: 35
End: 2020-12-11
Payer: MEDICAID

## 2020-12-11 ENCOUNTER — LAB VISIT (OUTPATIENT)
Dept: LAB | Facility: HOSPITAL | Age: 35
End: 2020-12-11
Attending: INTERNAL MEDICINE
Payer: MEDICAID

## 2020-12-11 VITALS
SYSTOLIC BLOOD PRESSURE: 105 MMHG | WEIGHT: 167.13 LBS | DIASTOLIC BLOOD PRESSURE: 77 MMHG | HEART RATE: 97 BPM | HEIGHT: 63 IN | BODY MASS INDEX: 29.61 KG/M2

## 2020-12-11 DIAGNOSIS — E78.5 DYSLIPIDEMIA: ICD-10-CM

## 2020-12-11 DIAGNOSIS — E11.9 TYPE 2 DIABETES MELLITUS WITHOUT COMPLICATION, WITH LONG-TERM CURRENT USE OF INSULIN: ICD-10-CM

## 2020-12-11 DIAGNOSIS — I50.42 CHRONIC COMBINED SYSTOLIC AND DIASTOLIC CONGESTIVE HEART FAILURE: Primary | ICD-10-CM

## 2020-12-11 DIAGNOSIS — Z79.4 TYPE 2 DIABETES MELLITUS WITHOUT COMPLICATION, WITH LONG-TERM CURRENT USE OF INSULIN: ICD-10-CM

## 2020-12-11 DIAGNOSIS — I50.9 CONGESTIVE HEART FAILURE, UNSPECIFIED HF CHRONICITY, UNSPECIFIED HEART FAILURE TYPE: ICD-10-CM

## 2020-12-11 DIAGNOSIS — I25.10 CORONARY ARTERY DISEASE INVOLVING NATIVE CORONARY ARTERY OF NATIVE HEART WITHOUT ANGINA PECTORIS: ICD-10-CM

## 2020-12-11 LAB
ALBUMIN SERPL BCP-MCNC: 4.1 G/DL (ref 3.5–5.2)
ALP SERPL-CCNC: 104 U/L (ref 55–135)
ALT SERPL W/O P-5'-P-CCNC: 10 U/L (ref 10–44)
ANION GAP SERPL CALC-SCNC: 15 MMOL/L (ref 8–16)
ANION GAP SERPL CALC-SCNC: 15 MMOL/L (ref 8–16)
AST SERPL-CCNC: 15 U/L (ref 10–40)
BILIRUB SERPL-MCNC: 1.9 MG/DL (ref 0.1–1)
BUN SERPL-MCNC: 28 MG/DL (ref 6–20)
BUN SERPL-MCNC: 28 MG/DL (ref 6–20)
CALCIUM SERPL-MCNC: 9.7 MG/DL (ref 8.7–10.5)
CALCIUM SERPL-MCNC: 9.7 MG/DL (ref 8.7–10.5)
CHLORIDE SERPL-SCNC: 100 MMOL/L (ref 95–110)
CHLORIDE SERPL-SCNC: 100 MMOL/L (ref 95–110)
CO2 SERPL-SCNC: 23 MMOL/L (ref 23–29)
CO2 SERPL-SCNC: 23 MMOL/L (ref 23–29)
CREAT SERPL-MCNC: 1.5 MG/DL (ref 0.5–1.4)
CREAT SERPL-MCNC: 1.5 MG/DL (ref 0.5–1.4)
EST. GFR  (AFRICAN AMERICAN): >60 ML/MIN/1.73 M^2
EST. GFR  (AFRICAN AMERICAN): >60 ML/MIN/1.73 M^2
EST. GFR  (NON AFRICAN AMERICAN): 59.5 ML/MIN/1.73 M^2
EST. GFR  (NON AFRICAN AMERICAN): 59.5 ML/MIN/1.73 M^2
GLUCOSE SERPL-MCNC: 222 MG/DL (ref 70–110)
GLUCOSE SERPL-MCNC: 222 MG/DL (ref 70–110)
MAGNESIUM SERPL-MCNC: 2.1 MG/DL (ref 1.6–2.6)
POTASSIUM SERPL-SCNC: 4.1 MMOL/L (ref 3.5–5.1)
POTASSIUM SERPL-SCNC: 4.1 MMOL/L (ref 3.5–5.1)
PROT SERPL-MCNC: 7.7 G/DL (ref 6–8.4)
SODIUM SERPL-SCNC: 138 MMOL/L (ref 136–145)
SODIUM SERPL-SCNC: 138 MMOL/L (ref 136–145)

## 2020-12-11 PROCEDURE — 99214 PR OFFICE/OUTPT VISIT, EST, LEVL IV, 30-39 MIN: ICD-10-PCS | Mod: S$PBB,,, | Performed by: INTERNAL MEDICINE

## 2020-12-11 PROCEDURE — 99214 OFFICE O/P EST MOD 30 MIN: CPT | Mod: PBBFAC | Performed by: INTERNAL MEDICINE

## 2020-12-11 PROCEDURE — 99999 PR PBB SHADOW E&M-EST. PATIENT-LVL IV: ICD-10-PCS | Mod: PBBFAC,,, | Performed by: INTERNAL MEDICINE

## 2020-12-11 PROCEDURE — 99999 PR PBB SHADOW E&M-EST. PATIENT-LVL IV: CPT | Mod: PBBFAC,,, | Performed by: INTERNAL MEDICINE

## 2020-12-11 PROCEDURE — 36415 COLL VENOUS BLD VENIPUNCTURE: CPT

## 2020-12-11 PROCEDURE — 80053 COMPREHEN METABOLIC PANEL: CPT

## 2020-12-11 PROCEDURE — 83735 ASSAY OF MAGNESIUM: CPT

## 2020-12-11 PROCEDURE — 99214 OFFICE O/P EST MOD 30 MIN: CPT | Mod: S$PBB,,, | Performed by: INTERNAL MEDICINE

## 2020-12-11 NOTE — LETTER
December 11, 2020        Orin Peters  [address]  [address]  [facility] [place] [place] [address]  Phone: [phone]  Fax: [phone]             [id number]  [address]  [place] [address]  Phone: [phone]   Patient: Kp Suarez   MR Number: [id number]   YOB: 1985   Date of Visit: 12/11/2020       Dear Dr. Orin Peters    Thank you for referring Kp Suarez to me for evaluation. Attached you will find relevant portions of my assessment and plan of care.    If you have questions, please do not hesitate to call me. I look forward to following Kp Suarez along with you.    Sincerely,    Vasiliy Avery MD    Enclosure    If you would like to receive this communication electronically, please contact [email or web address] or [phone] to request [employer] access.    [employer] is a tool which provides read-only access to select patient information with whom you have a relationship. Its easy to use and provides real time access to review your patients record including encounter summaries, notes, results, and demographic information.    If you feel you have received this communication in error or would no longer like to receive these types of communications, please e-mail [email or web address]

## 2020-12-11 NOTE — PATIENT INSTRUCTIONS
1. Continue taking torsemide at current dose. You still have some extra fluid on you (significantly improved.  2. Continue taking spironolactone and carvedilol at current doses.  3. Take entresto twice a day. As long as BP is 8/50 or above, it is ok to take your medications.  4. Follow up Blood test weekly for the next month.  5. Follow up appointment in 2 months with labs.

## 2020-12-18 ENCOUNTER — TELEPHONE (OUTPATIENT)
Dept: TRANSPLANT | Facility: CLINIC | Age: 35
End: 2020-12-18

## 2020-12-31 ENCOUNTER — TELEPHONE (OUTPATIENT)
Dept: TRANSPLANT | Facility: CLINIC | Age: 35
End: 2020-12-31

## 2020-12-31 NOTE — TELEPHONE ENCOUNTER
Received weekly labs faxed from [facility]  12/31 after calling to request results from 12/30 draw.     K+ 4.2  BUN 28.72   Creat 1.19    All values within pt's normal range. Will continue to monitor.

## 2021-01-04 ENCOUNTER — TELEPHONE (OUTPATIENT)
Dept: TRANSPLANT | Facility: CLINIC | Age: 36
End: 2021-01-04

## 2021-01-07 ENCOUNTER — TELEPHONE (OUTPATIENT)
Dept: TRANSPLANT | Facility: CLINIC | Age: 36
End: 2021-01-07